# Patient Record
Sex: MALE | Race: WHITE | Employment: UNEMPLOYED | URBAN - METROPOLITAN AREA
[De-identification: names, ages, dates, MRNs, and addresses within clinical notes are randomized per-mention and may not be internally consistent; named-entity substitution may affect disease eponyms.]

---

## 2024-04-29 ENCOUNTER — TELEPHONE (OUTPATIENT)
Dept: GASTROENTEROLOGY | Age: 24
End: 2024-04-29

## 2025-05-14 ENCOUNTER — OFFICE VISIT (OUTPATIENT)
Age: 25
End: 2025-05-14

## 2025-05-14 VITALS
DIASTOLIC BLOOD PRESSURE: 66 MMHG | HEIGHT: 74 IN | BODY MASS INDEX: 24.38 KG/M2 | HEART RATE: 72 BPM | SYSTOLIC BLOOD PRESSURE: 112 MMHG | WEIGHT: 190 LBS | TEMPERATURE: 98.4 F | RESPIRATION RATE: 18 BRPM | OXYGEN SATURATION: 97 %

## 2025-05-14 DIAGNOSIS — J01.40 ACUTE NON-RECURRENT PANSINUSITIS: Primary | ICD-10-CM

## 2025-05-14 RX ORDER — AZITHROMYCIN 250 MG/1
TABLET, FILM COATED ORAL
Qty: 1 PACKET | Refills: 0 | Status: SHIPPED | OUTPATIENT
Start: 2025-05-14

## 2025-05-14 RX ORDER — DEXTROMETHORPHAN HYDROBROMIDE, GUAIFENESIN AND PSEUDOEPHEDRINE HYDROCHLORIDE 15; 400; 60 MG/1; MG/1; MG/1
1 TABLET ORAL 3 TIMES DAILY PRN
Qty: 30 TABLET | Refills: 0 | Status: SHIPPED | OUTPATIENT
Start: 2025-05-14 | End: 2025-05-24

## 2025-05-14 RX ORDER — BENZONATATE 200 MG/1
200 CAPSULE ORAL 3 TIMES DAILY PRN
Qty: 30 CAPSULE | Refills: 0 | Status: SHIPPED | OUTPATIENT
Start: 2025-05-14 | End: 2025-05-24

## 2025-05-14 ASSESSMENT — ENCOUNTER SYMPTOMS
TROUBLE SWALLOWING: 0
SORE THROAT: 1
BACK PAIN: 0
COLOR CHANGE: 0
NAUSEA: 0
ABDOMINAL DISTENTION: 0
CHEST TIGHTNESS: 0
EYE REDNESS: 0
EYE PAIN: 0
FACIAL SWELLING: 0
DIARRHEA: 0
SHORTNESS OF BREATH: 0
VOMITING: 0
CONSTIPATION: 0
SINUS PAIN: 1
COUGH: 0
ABDOMINAL PAIN: 0
EYE DISCHARGE: 0
SINUS PRESSURE: 1
VOICE CHANGE: 0

## 2025-05-14 NOTE — PROGRESS NOTES
Chief complaint(s): Sinusitis    History of present illness :     Colton Haskins  is a  very pleasant  24 y.o. male  patient presented to the urgent care today for evaluation of sinusitis.  Patient has a known history of chronic recurrent sinusitis and seasonal allergies.  He takes daily antihistamine and Flonase nasal spray .   He has been complaining of severe facial pain, dental pain frontal headache, dizziness , postnasal drip and purulent greenish nasal discharge for the last 2 weeks.  He  has been trying her regular medicine and over-the-counter sinus/cold medicine with minimal relief  of symptoms.  He has been taking Tylenol/Motrin for the headaches and using humidifier with brief minimal relief.   His symptoms have gotten worse the last 3 days.  He developed sore throat, intermittent dry cough, fever , chills , body aches and generalized weakness.  symptoms are affecting his sleep and quality of life. No sore throat or difficulty swallowing . No earache. No vision changes. No neck stiffness or pain. No chest pain , shortness of breath or cough . No abdominal or back pain.  No nausea or vomiting.  No change in urination or bowel movement.  No neurovascular or motor changes in upper or lower extremities.  No rash.  No recent travel.  Denies starting any new medications  .  All review of systems are mentioned in the HPI otherwise unremarkable.          PAST MEDICAL HISTORY    Past Medical History:   Diagnosis Date    Allergic rhinitis        SURGICAL HISTORY    Past Surgical History:   Procedure Laterality Date    ADENOIDECTOMY      TONSILLECTOMY         CURRENT MEDICATIONS    Current Outpatient Rx   Medication Sig Dispense Refill    azithromycin (ZITHROMAX) 250 MG tablet Take 2 tabs (500 mg) on Day 1, and take 1 tab (250 mg) on days 2 through 5. 1 packet 0    Pseudoephedrine-DM-GG (CAPMIST DM) 60- MG TABS Take 1 tablet by mouth 3 times daily as needed (CONGESTION) 30 tablet 0    benzonatate